# Patient Record
Sex: MALE | Race: WHITE
[De-identification: names, ages, dates, MRNs, and addresses within clinical notes are randomized per-mention and may not be internally consistent; named-entity substitution may affect disease eponyms.]

---

## 2020-07-04 ENCOUNTER — HOSPITAL ENCOUNTER (EMERGENCY)
Dept: HOSPITAL 47 - EC | Age: 75
Discharge: HOME | End: 2020-07-04
Payer: MEDICARE

## 2020-07-04 VITALS — RESPIRATION RATE: 18 BRPM

## 2020-07-04 VITALS — TEMPERATURE: 97.5 F | DIASTOLIC BLOOD PRESSURE: 92 MMHG | HEART RATE: 69 BPM | SYSTOLIC BLOOD PRESSURE: 139 MMHG

## 2020-07-04 DIAGNOSIS — M62.838: ICD-10-CM

## 2020-07-04 DIAGNOSIS — M62.830: ICD-10-CM

## 2020-07-04 DIAGNOSIS — R42: ICD-10-CM

## 2020-07-04 DIAGNOSIS — N39.0: Primary | ICD-10-CM

## 2020-07-04 DIAGNOSIS — R00.1: ICD-10-CM

## 2020-07-04 DIAGNOSIS — R33.9: ICD-10-CM

## 2020-07-04 DIAGNOSIS — R61: ICD-10-CM

## 2020-07-04 DIAGNOSIS — Z88.5: ICD-10-CM

## 2020-07-04 DIAGNOSIS — I10: ICD-10-CM

## 2020-07-04 DIAGNOSIS — Z79.899: ICD-10-CM

## 2020-07-04 LAB
ALBUMIN SERPL-MCNC: 4.3 G/DL (ref 3.5–5)
ALP SERPL-CCNC: 52 U/L (ref 38–126)
ALT SERPL-CCNC: 28 U/L (ref 4–49)
ANION GAP SERPL CALC-SCNC: 11 MMOL/L
AST SERPL-CCNC: 32 U/L (ref 17–59)
BASOPHILS # BLD AUTO: 0 K/UL (ref 0–0.2)
BASOPHILS NFR BLD AUTO: 0 %
BUN SERPL-SCNC: 18 MG/DL (ref 9–20)
CALCIUM SPEC-MCNC: 9 MG/DL (ref 8.4–10.2)
CHLORIDE SERPL-SCNC: 100 MMOL/L (ref 98–107)
CO2 SERPL-SCNC: 24 MMOL/L (ref 22–30)
EOSINOPHIL # BLD AUTO: 0.2 K/UL (ref 0–0.7)
EOSINOPHIL NFR BLD AUTO: 1 %
ERYTHROCYTE [DISTWIDTH] IN BLOOD BY AUTOMATED COUNT: 4.6 M/UL (ref 4.3–5.9)
ERYTHROCYTE [DISTWIDTH] IN BLOOD: 12.3 % (ref 11.5–15.5)
GLUCOSE BLD-MCNC: 113 MG/DL (ref 75–99)
GLUCOSE SERPL-MCNC: 172 MG/DL (ref 74–99)
HCT VFR BLD AUTO: 42.6 % (ref 39–53)
HGB BLD-MCNC: 14.7 GM/DL (ref 13–17.5)
LYMPHOCYTES # SPEC AUTO: 1.4 K/UL (ref 1–4.8)
LYMPHOCYTES NFR SPEC AUTO: 9 %
MCH RBC QN AUTO: 31.9 PG (ref 25–35)
MCHC RBC AUTO-ENTMCNC: 34.4 G/DL (ref 31–37)
MCV RBC AUTO: 92.7 FL (ref 80–100)
MONOCYTES # BLD AUTO: 1 K/UL (ref 0–1)
MONOCYTES NFR BLD AUTO: 6 %
NEUTROPHILS # BLD AUTO: 12.3 K/UL (ref 1.3–7.7)
NEUTROPHILS NFR BLD AUTO: 81 %
PH UR: 6 [PH] (ref 5–8)
PLATELET # BLD AUTO: 243 K/UL (ref 150–450)
POTASSIUM SERPL-SCNC: 3.4 MMOL/L (ref 3.5–5.1)
PROT SERPL-MCNC: 7.2 G/DL (ref 6.3–8.2)
PROT UR QL: (no result)
RBC UR QL: 1 /HPF (ref 0–5)
SODIUM SERPL-SCNC: 135 MMOL/L (ref 137–145)
SP GR UR: 1.02 (ref 1–1.03)
UROBILINOGEN UR QL STRIP: <2 MG/DL (ref ?–2)
WBC # BLD AUTO: 15.1 K/UL (ref 3.8–10.6)
WBC # UR AUTO: 89 /HPF (ref 0–5)

## 2020-07-04 PROCEDURE — 85025 COMPLETE CBC W/AUTO DIFF WBC: CPT

## 2020-07-04 PROCEDURE — 36415 COLL VENOUS BLD VENIPUNCTURE: CPT

## 2020-07-04 PROCEDURE — 51798 US URINE CAPACITY MEASURE: CPT

## 2020-07-04 PROCEDURE — 96374 THER/PROPH/DIAG INJ IV PUSH: CPT

## 2020-07-04 PROCEDURE — 80053 COMPREHEN METABOLIC PANEL: CPT

## 2020-07-04 PROCEDURE — 99284 EMERGENCY DEPT VISIT MOD MDM: CPT

## 2020-07-04 PROCEDURE — 87086 URINE CULTURE/COLONY COUNT: CPT

## 2020-07-04 PROCEDURE — 51702 INSERT TEMP BLADDER CATH: CPT

## 2020-07-04 PROCEDURE — 81001 URINALYSIS AUTO W/SCOPE: CPT

## 2020-07-04 NOTE — ED
General Adult HPI





- General


Chief complaint: Urogenital


Stated complaint: poss UTI


Time Seen by Provider: 07/04/20 09:20


Source: patient, RN notes reviewed, old records reviewed


Mode of arrival: ambulatory


Limitations: no limitations





- History of Present Illness


Initial comments: 





This is a 75-year-old male who presents emergency department stating that over 

the last 4 days he's been urinating a lot less.  Patient initially thought it 

was because he was dehydrated but he's been drinking quite a bit of water.  

Patient states he is able to go but he still feels full like he doesn't 

completely empty.  Patient states she's never had this situation before.  

Patient states he has no prostate problems.  Patient denies any fever chills.  

Patient states the fullness is in the suprapubic region.  Patient denies any 

back pain.  Patient denies any dysuria or hematuria.





- Related Data


                                Home Medications











 Medication  Instructions  Recorded  Confirmed


 


Telmisartan/Hydrochlorothiazid 1 each PO DAILY 07/27/15 07/29/15





[Micardis Hct 80-12.5 mg Tablet]   


 


Trandolapril/Verapamil HCl [Tarka 1 each PO DAILY 07/27/15 07/29/15





ER 1-240 mg Tablet]   








                                  Previous Rx's











 Medication  Instructions  Recorded


 


Sulfamethox-Tmp 800-160Mg [Bactrim 1 each PO Q12HR #14 tab 07/04/20





-160 mg]  











                                    Allergies











Allergy/AdvReac Type Severity Reaction Status Date / Time


 


codeine AdvReac  BAD DREAMS Verified 07/04/20 09:21














Review of Systems


ROS Statement: 


Those systems with pertinent positive or pertinent negative responses have been 

documented in the HPI.





ROS Other: All systems not noted in ROS Statement are negative.





Past Medical History


Past Medical History: Hypertension


History of Any Multi-Drug Resistant Organisms: None Reported


Additional Past Surgical History / Comment(s): COLONOSCOPY


Past Anesthesia/Blood Transfusion Reactions: No Reported Reaction


Past Psychological History: No Psychological Hx Reported


Smoking Status: Never smoker


Past Alcohol Use History: None Reported


Past Drug Use History: None Reported





General Exam





- General Exam Comments


Initial Comments: 





GENERAL:


Patient is well-developed and well-nourished.  Patient is nontoxic and well-

hydrated and is in mild distress.





ENT:


Neck is soft and supple.  No significant lymphadenopathy is noted.  Oropharynx 

is clear.  Moist mucous membranes.  Neck has full range of motion without 

eliciting any pain. 





EYES:


The sclera were anicteric and conjunctiva were pink and moist.  Extraocular 

movements were intact and pupils were equal round and reactive to light.  

Eyelids were unremarkable. 





ABDOMEN:


Mild suprapubic fullness and tenderness





SKIN:


Skin is clear with no lesions or rashes and otherwise unremarkable.





NEUROLOGIC:


Patient is alert and oriented x3.  Cranial nerves II through XII are grossly 

intact.  Motor and sensory are also intact.  Normal speech, volume and content. 

Symmetrical smile.  





MUSCULOSKELETAL:


Normal extremities with adequate strength and full range of motion.  





LYMPHATICS:


No significant lymphadenopathy is noted





PSYCHIATRIC:


Normal psychiatric evaluation. 


Limitations: no limitations





Course


                                   Vital Signs











  07/04/20





  09:22


 


Temperature 98.2 F


 


Pulse Rate 72


 


Respiratory 18





Rate 


 


Blood Pressure 162/98


 


O2 Sat by Pulse 96





Oximetry 














Medical Decision Making





- Medical Decision Making





Patient had white cells in his urine so I did treat him for a urinary tract 

infection even though he did not have a lot of bacteria.





Patient got up to get dressed he became lightheaded and somewhat diaphoretic and

needed to lay down.  At that point time he stated he had a foot spasm as well as

some spasm in his back and the flank.  Patient states he never had any anterior 

chest pain denied any difficulty breathing.





EKG was done shows sinus bradycardia 50 bpm ID interval 218 QRS is 110 QT 

interval 44 QTC is 398.  Patient states the episode lasted about 10 seconds.





With the patient rest she is up slowly and was fine he had no more similar 

symptoms.  Patient was discharged





- Lab Data


Result diagrams: 


                                 07/04/20 10:01 07/04/20 10:01


                                   Lab Results











  07/04/20 07/04/20 07/04/20 Range/Units





  09:38 10:01 10:01 


 


WBC   15.1 H   (3.8-10.6)  k/uL


 


RBC   4.60   (4.30-5.90)  m/uL


 


Hgb   14.7   (13.0-17.5)  gm/dL


 


Hct   42.6   (39.0-53.0)  %


 


MCV   92.7   (80.0-100.0)  fL


 


MCH   31.9   (25.0-35.0)  pg


 


MCHC   34.4   (31.0-37.0)  g/dL


 


RDW   12.3   (11.5-15.5)  %


 


Plt Count   243   (150-450)  k/uL


 


Neutrophils %   81   %


 


Lymphocytes %   9   %


 


Monocytes %   6   %


 


Eosinophils %   1   %


 


Basophils %   0   %


 


Neutrophils #   12.3 H   (1.3-7.7)  k/uL


 


Lymphocytes #   1.4   (1.0-4.8)  k/uL


 


Monocytes #   1.0   (0-1.0)  k/uL


 


Eosinophils #   0.2   (0-0.7)  k/uL


 


Basophils #   0.0   (0-0.2)  k/uL


 


Sodium    135 L  (137-145)  mmol/L


 


Potassium    3.4 L  (3.5-5.1)  mmol/L


 


Chloride    100  ()  mmol/L


 


Carbon Dioxide    24  (22-30)  mmol/L


 


Anion Gap    11  mmol/L


 


BUN    18  (9-20)  mg/dL


 


Creatinine    1.06  (0.66-1.25)  mg/dL


 


Est GFR (CKD-EPI)AfAm    81  (>60 ml/min/1.73 sqM)  


 


Est GFR (CKD-EPI)NonAf    70  (>60 ml/min/1.73 sqM)  


 


Glucose    172 H  (74-99)  mg/dL


 


POC Glucose (mg/dL)     (75-99)  mg/dL


 


POC Glu Operater ID     


 


Calcium    9.0  (8.4-10.2)  mg/dL


 


Total Bilirubin    1.8 H  (0.2-1.3)  mg/dL


 


AST    32  (17-59)  U/L


 


ALT    28  (4-49)  U/L


 


Alkaline Phosphatase    52  ()  U/L


 


Total Protein    7.2  (6.3-8.2)  g/dL


 


Albumin    4.3  (3.5-5.0)  g/dL


 


Urine Color  Yellow    


 


Urine Appearance  Cloudy    (Clear)  


 


Urine pH  6.0    (5.0-8.0)  


 


Ur Specific Gravity  1.016    (1.001-1.035)  


 


Urine Protein  1+ H    (Negative)  


 


Urine Glucose (UA)  Negative    (Negative)  


 


Urine Ketones  Negative    (Negative)  


 


Urine Blood  Negative    (Negative)  


 


Urine Nitrite  Negative    (Negative)  


 


Urine Bilirubin  Negative    (Negative)  


 


Urine Urobilinogen  <2.0    (<2.0)  mg/dL


 


Ur Leukocyte Esterase  Large H    (Negative)  


 


Urine RBC  1    (0-5)  /hpf


 


Urine WBC  89 H    (0-5)  /hpf


 


Urine Bacteria  Rare H    (None)  /hpf


 


Urine Mucus  Rare H    (None)  /hpf














  07/04/20 Range/Units





  11:39 


 


WBC   (3.8-10.6)  k/uL


 


RBC   (4.30-5.90)  m/uL


 


Hgb   (13.0-17.5)  gm/dL


 


Hct   (39.0-53.0)  %


 


MCV   (80.0-100.0)  fL


 


MCH   (25.0-35.0)  pg


 


MCHC   (31.0-37.0)  g/dL


 


RDW   (11.5-15.5)  %


 


Plt Count   (150-450)  k/uL


 


Neutrophils %   %


 


Lymphocytes %   %


 


Monocytes %   %


 


Eosinophils %   %


 


Basophils %   %


 


Neutrophils #   (1.3-7.7)  k/uL


 


Lymphocytes #   (1.0-4.8)  k/uL


 


Monocytes #   (0-1.0)  k/uL


 


Eosinophils #   (0-0.7)  k/uL


 


Basophils #   (0-0.2)  k/uL


 


Sodium   (137-145)  mmol/L


 


Potassium   (3.5-5.1)  mmol/L


 


Chloride   ()  mmol/L


 


Carbon Dioxide   (22-30)  mmol/L


 


Anion Gap   mmol/L


 


BUN   (9-20)  mg/dL


 


Creatinine   (0.66-1.25)  mg/dL


 


Est GFR (CKD-EPI)AfAm   (>60 ml/min/1.73 sqM)  


 


Est GFR (CKD-EPI)NonAf   (>60 ml/min/1.73 sqM)  


 


Glucose   (74-99)  mg/dL


 


POC Glucose (mg/dL)  113 H  (75-99)  mg/dL


 


POC Glu Operater ID  Hans Luong  


 


Calcium   (8.4-10.2)  mg/dL


 


Total Bilirubin   (0.2-1.3)  mg/dL


 


AST   (17-59)  U/L


 


ALT   (4-49)  U/L


 


Alkaline Phosphatase   ()  U/L


 


Total Protein   (6.3-8.2)  g/dL


 


Albumin   (3.5-5.0)  g/dL


 


Urine Color   


 


Urine Appearance   (Clear)  


 


Urine pH   (5.0-8.0)  


 


Ur Specific Gravity   (1.001-1.035)  


 


Urine Protein   (Negative)  


 


Urine Glucose (UA)   (Negative)  


 


Urine Ketones   (Negative)  


 


Urine Blood   (Negative)  


 


Urine Nitrite   (Negative)  


 


Urine Bilirubin   (Negative)  


 


Urine Urobilinogen   (<2.0)  mg/dL


 


Ur Leukocyte Esterase   (Negative)  


 


Urine RBC   (0-5)  /hpf


 


Urine WBC   (0-5)  /hpf


 


Urine Bacteria   (None)  /hpf


 


Urine Mucus   (None)  /hpf














Disposition


Clinical Impression: 


 Urinary retention, Urinary tract infection





Disposition: HOME SELF-CARE


Condition: Good


Instructions (If sedation given, give patient instructions):  Urinary Retention 

in Men (ED), Urinary Tract Infection in Men (ED)


Prescriptions: 


Sulfamethox-Tmp 800-160Mg [Bactrim -160 mg] 1 each PO Q12HR #14 tab


Is patient prescribed a controlled substance at d/c from ED?: No


Referrals: 


Walker Ponce MD [Primary Care Provider] - 1-2 days


Time of Disposition: 11:00